# Patient Record
Sex: MALE | Race: WHITE | NOT HISPANIC OR LATINO | URBAN - METROPOLITAN AREA
[De-identification: names, ages, dates, MRNs, and addresses within clinical notes are randomized per-mention and may not be internally consistent; named-entity substitution may affect disease eponyms.]

---

## 2017-06-21 ENCOUNTER — COMMUNICATION - HEALTHEAST (OUTPATIENT)
Dept: INTERNAL MEDICINE | Facility: CLINIC | Age: 62
End: 2017-06-21

## 2017-06-21 ENCOUNTER — OFFICE VISIT - HEALTHEAST (OUTPATIENT)
Dept: INTERNAL MEDICINE | Facility: CLINIC | Age: 62
End: 2017-06-21

## 2017-06-21 DIAGNOSIS — Z12.5 PROSTATE CANCER SCREENING: ICD-10-CM

## 2017-06-21 DIAGNOSIS — Z79.899 ENCOUNTER FOR LONG-TERM (CURRENT) USE OF OTHER MEDICATIONS: ICD-10-CM

## 2017-06-21 DIAGNOSIS — E78.00 PURE HYPERCHOLESTEROLEMIA: ICD-10-CM

## 2017-06-21 DIAGNOSIS — Z00.00 HEALTH CARE MAINTENANCE: ICD-10-CM

## 2017-06-21 DIAGNOSIS — N40.0 BENIGN PROSTATIC HYPERPLASIA, PRESENCE OF LOWER URINARY TRACT SYMPTOMS UNSPECIFIED, UNSPECIFIED MORPHOLOGY: ICD-10-CM

## 2017-06-21 DIAGNOSIS — I10 ESSENTIAL HYPERTENSION, BENIGN: ICD-10-CM

## 2017-06-21 LAB
CHOLEST SERPL-MCNC: 155 MG/DL
FASTING STATUS PATIENT QL REPORTED: YES
HDLC SERPL-MCNC: 46 MG/DL
LDLC SERPL CALC-MCNC: 96 MG/DL
PSA SERPL-MCNC: 0.9 NG/ML (ref 0–4.5)
TRIGL SERPL-MCNC: 65 MG/DL

## 2017-06-21 ASSESSMENT — MIFFLIN-ST. JEOR: SCORE: 1641.95

## 2018-06-25 ENCOUNTER — COMMUNICATION - HEALTHEAST (OUTPATIENT)
Dept: INTERNAL MEDICINE | Facility: CLINIC | Age: 63
End: 2018-06-25

## 2018-06-25 ENCOUNTER — OFFICE VISIT - HEALTHEAST (OUTPATIENT)
Dept: INTERNAL MEDICINE | Facility: CLINIC | Age: 63
End: 2018-06-25

## 2018-06-25 DIAGNOSIS — Z00.00 HEALTH CARE MAINTENANCE: ICD-10-CM

## 2018-06-25 DIAGNOSIS — I10 ESSENTIAL HYPERTENSION, BENIGN: ICD-10-CM

## 2018-06-25 DIAGNOSIS — Z12.5 SCREENING FOR PROSTATE CANCER: ICD-10-CM

## 2018-06-25 DIAGNOSIS — E78.00 PURE HYPERCHOLESTEROLEMIA: ICD-10-CM

## 2018-06-25 DIAGNOSIS — Z51.81 MEDICATION MONITORING ENCOUNTER: ICD-10-CM

## 2018-06-25 LAB
ALBUMIN SERPL-MCNC: 4 G/DL (ref 3.5–5)
ALP SERPL-CCNC: 58 U/L (ref 45–120)
ALT SERPL W P-5'-P-CCNC: 25 U/L (ref 0–45)
ANION GAP SERPL CALCULATED.3IONS-SCNC: 8 MMOL/L (ref 5–18)
AST SERPL W P-5'-P-CCNC: 22 U/L (ref 0–40)
BILIRUB SERPL-MCNC: 0.8 MG/DL (ref 0–1)
BUN SERPL-MCNC: 12 MG/DL (ref 8–22)
CALCIUM SERPL-MCNC: 9 MG/DL (ref 8.5–10.5)
CHLORIDE BLD-SCNC: 105 MMOL/L (ref 98–107)
CHOLEST SERPL-MCNC: 149 MG/DL
CO2 SERPL-SCNC: 28 MMOL/L (ref 22–31)
CREAT SERPL-MCNC: 0.94 MG/DL (ref 0.7–1.3)
ERYTHROCYTE [DISTWIDTH] IN BLOOD BY AUTOMATED COUNT: 11.8 % (ref 11–14.5)
FASTING STATUS PATIENT QL REPORTED: YES
GFR SERPL CREATININE-BSD FRML MDRD: >60 ML/MIN/1.73M2
GLUCOSE BLD-MCNC: 100 MG/DL (ref 70–125)
HCT VFR BLD AUTO: 48.8 % (ref 40–54)
HDLC SERPL-MCNC: 43 MG/DL
HGB BLD-MCNC: 16.4 G/DL (ref 14–18)
LDLC SERPL CALC-MCNC: 85 MG/DL
MCH RBC QN AUTO: 31.6 PG (ref 27–34)
MCHC RBC AUTO-ENTMCNC: 33.7 G/DL (ref 32–36)
MCV RBC AUTO: 94 FL (ref 80–100)
PLATELET # BLD AUTO: 199 THOU/UL (ref 140–440)
PMV BLD AUTO: 7.7 FL (ref 7–10)
POTASSIUM BLD-SCNC: 3.7 MMOL/L (ref 3.5–5)
PROT SERPL-MCNC: 7 G/DL (ref 6–8)
PSA SERPL-MCNC: 1.1 NG/ML (ref 0–4.5)
RBC # BLD AUTO: 5.2 MILL/UL (ref 4.4–6.2)
SODIUM SERPL-SCNC: 141 MMOL/L (ref 136–145)
TRIGL SERPL-MCNC: 103 MG/DL
WBC: 6.5 THOU/UL (ref 4–11)

## 2018-06-25 ASSESSMENT — MIFFLIN-ST. JEOR: SCORE: 1649.89

## 2021-05-29 ENCOUNTER — RECORDS - HEALTHEAST (OUTPATIENT)
Dept: ADMINISTRATIVE | Facility: CLINIC | Age: 66
End: 2021-05-29

## 2021-05-30 ENCOUNTER — RECORDS - HEALTHEAST (OUTPATIENT)
Dept: ADMINISTRATIVE | Facility: CLINIC | Age: 66
End: 2021-05-30

## 2021-05-31 ENCOUNTER — RECORDS - HEALTHEAST (OUTPATIENT)
Dept: ADMINISTRATIVE | Facility: CLINIC | Age: 66
End: 2021-05-31

## 2021-05-31 VITALS — HEIGHT: 69 IN | WEIGHT: 195 LBS | BODY MASS INDEX: 28.88 KG/M2

## 2021-06-01 VITALS — WEIGHT: 195 LBS | BODY MASS INDEX: 28.88 KG/M2 | HEIGHT: 69 IN

## 2021-06-11 NOTE — PROGRESS NOTES
[facility] Follow Up Note    Ernesto Lawson   61 y.o. male    Date of Visit: 6/21/2017    Chief Complaint   Patient presents with     Annual Exam     pt fasting     Subjective  ernesto is here for health and physical exam.    His main issue is cardiovascular risk with hypertension and hypercholesterolemia.  Over 10 years ago he had a cardiac CT scan with a score of 402.  2007 stress test negative.  His cholesterol and hypertension of been well controlled.  On Lipitor and amlodipine and aspirin.    No epigastric pain or GI bleeding.    He is very active and bikes 28 miles yesterday, able to bike quickly uphills.  No chest pain.  Sometimes he feels mildly more short of breath with exertion and he has in the past, it has been slowly progressive over a number of years.  He still cross-country skis in the winter.    He has been retired for a year, that is going well for him.  He feels he has been more active since retiring.  He sleeps well at night and no sleep apnea symptoms.    He has never smoked.  No new cough.    His mother had hypertension and his sister has coronary artery disease.  Her graph patient has had some chronic intermittent tension type headaches for many years.  He had another evaluation last summer with negative brain MRI.  He has a vague blurry vision feeling toward the end of the day at times.  Feels his peripheral vision is less toward the end of the day, he describes an eye fatigue type scenario.  That has been intermittent and present for over a year.  It has been 2 years since he had cataract surgery has not seen the eye doctor since then.  There was some talk of a YAG laser for the film on his lens implant.  He does have an appointment later this summer with ophthalmology in August.    June 2016 CT scan of the abdomen showed multiple 1-2 mm kidney stones but not obstructing and he has had no symptoms.    Patient had physical exam by another physician last year and the  "calcified seminal vesicle was noted he was sent for prostate biopsy last June which was negative.  He developed some prostatitis after that difficult voiding, but is voiding normally now no longer needing Flomax.    Bowel movements are normal.  Colonoscopy February 2011 negative.  No family history of colon cancer.  10 year follow-up.    His diet is good.    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    He is  without children.    No skin changes or history of skin cancer.    He gets occasional food stuck in his esophagus, but that is rare, usually when eating quickly, present for many years without progression.  No heartburn issues.    No pain or arthritis complaints.    PMHx:    Past Medical History:   Diagnosis Date     Hyperlipidemia      Hypertension      PSHx:    Past Surgical History:   Procedure Laterality Date     EYE SURGERY      cataract, both eyes, 2015     Immunizations:   Immunization History   Administered Date(s) Administered     Tdap 05/24/2011       ROS A comprehensive review of systems was performed and was otherwise negative    Medications, allergies, and problem list were reviewed and updated    Exam  /60  Pulse 71  Ht 5' 8.5\" (1.74 m)  Wt 195 lb (88.5 kg)  SpO2 96%  BMI 29.22 kg/m2  Pupils and irises equal and reactive.  Eyes appear normal.  Extraocular muscles normal.  No conjunctivitis or scleral icterus.  Alert and oriented ×3 and normal mood and affect.  External ears and nose exam is normal.  Tympanic membranes are normal.  Pharynx is normal.  No leukoplakia or oral lesions.  Teeth in good condition.  No cervical or supraclavicular or axillary or inguinal adenopathy.  No JVD and no carotid bruits.  No thyromegaly or nodularity.  Lungs clear to auscultation with normal respiratory excursion.  Heart is regular without murmur.  Abdomen is mildly overweight but nontender no hepatosplenomegaly or pulsatile mass.  No ankle edema " and +2 pedal pulses bilaterally.  Feet in good condition.  He does have toenail fungus thickening.  Testicles normal bilaterally and normal external genitalia, uncircumcised.  No inguinal adenopathy.  Prostate exam is smooth and symmetric and no prostate nodule.  He has a calcified seminal vesicle on the right unchanged.  Skin exam without suspicious lesions.  Gait normal.  Muscle skeletal exam normal.    Assessment/Plan  1. Health care maintenance  Main issue is cardiovascular risk, well-controlled with current medical management.    He is having some vision changes and is due for a routine eye exam in any case, he has an appointment in August with ophthalmology.    Ten-year colonoscopy February 2021.    Healthy diet and regular exercise discussed, continue.    2. Hypercholesterolemia  Well-controlled on Lipitor 10 mg.  Goal LDL less than 130.  - atorvastatin (LIPITOR) 10 MG tablet; TAKE ONE TABLET DAILY AFTER EVENING MEAL  Dispense: 90 tablet; Refill: 3  - Lipid [place]    Excellent exercise tolerance.  I discussed if he does have any worsening shortness of breath with exertion or any new chest, shoulder or back symptoms or neck symptoms, seek medical attention immediately to evaluate for further evaluation or possible stress testing.    3. Benign Essential Hypertension  Controlled, continue amlodipine 5 mg a day  - amLODIPine (NORVASC) 5 MG tablet; Take 1 tablet (5 mg total) by mouth daily.  Dispense: 90 tablet; Refill: 3    4. Encounter for long-term (current) use of other medications    - Comprehensive Metabolic Panel  - HM2(CBC w/o Differential)    5. Benign prostatic hyperplasia, presence of lower urinary tract symptoms unspecified, unspecified morphology  No symptoms now, no longer needing Flomax    6. Prostate cancer screening  Calcified seminal vesicle but otherwise negative exam.  - PSA (Prostatic-Specific Antigen), Annual Screen    Nonspecific vision symptoms, see ophthalmology    Chronic tension  headaches, negative neurologic exam last year.  Patient was told if any worsening or new symptoms, to return for further evaluation.    Nephrolithiasis, asymptomatic.  Stay well-hydrated.    Return in about 1 year (around 6/21/2018) for Annual physical.   There are no Patient Instructions on file for this visit.  Nicolás Clements MD      Current Outpatient Prescriptions   Medication Sig Dispense Refill     aspirin 81 MG EC tablet Take 81 mg by mouth daily.       amLODIPine (NORVASC) 5 MG tablet Take 1 tablet (5 mg total) by mouth daily. 90 tablet 3     atorvastatin (LIPITOR) 10 MG tablet TAKE ONE TABLET DAILY AFTER EVENING MEAL 90 tablet 3     No current facility-administered medications for this visit.      No Known Allergies  Social History   Substance Use Topics     Smoking status: Never Smoker     Smokeless tobacco: Never Used     Alcohol use None

## 2021-06-18 NOTE — PROGRESS NOTES
[facility] Follow Up Note    Ernesto Lawson   62 y.o. male    Date of Visit: 6/25/2018    Chief Complaint   Patient presents with     Annual Exam     fasting     Subjective  Alma Delia is here for health maintenance physical exam and follow-up on hypertension hypercholesterolemia.    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  Remains physically active with regular bike riding and walking.  Cross-country skiing in the winter.    Generally good exertional ability.  No increasing shortness of breath with exertion.  No chest pain or chest pressure with exertion.    He occasionally gets short palpitations of last 20 seconds or less at rest, these are infrequent.  No lightheaded dizzy spells or other symptoms with them.  He has not had the diagnosis of arrhythmia.    He feels he sleeps well at night denies significant daytime sleepiness, does not feel he has sleep apnea.  He does snore but no witnessed apneas.  He does not want to do a sleep study.    No significant alcohol.  He has never smoked.    Hypertension is been well-controlled without lightheaded dizzy spells on amlodipine 5 mg a day.  Blood pressures are running in the 120s over 70s-80.  No edema.    He is on Lipitor 10 mg a day.  Cholesterol is been well-controlled.  No epigastric pain or bleeding on the aspirin.    Over 10 years ago his cardiac CT scan with a score of 402.  2007 stress test negative.    He has had no worsening swallowing difficulty.  He occasionally gets some food stuck on a rare occasion especially with spicy food.  Only rare heartburn.  He is reported that in years past.    Tension headaches occasionally.  No worsening frequency.  Usually bilateral occipital.  No headache currently.  2016 MRI was negative.    He has had occasional double vision, last episode was 2 weeks ago and previous to that was 6 months ago.  Last just about 30 seconds.  Not associated with a  "headache.  No flashing lights.  No other focal weakness issues.  No change in gait.    He did see the ophthalmologist 4 weeks ago, vision was normal and exam was normal.  Her graph 2015 cataract surgery was done.    No flank pain or abdominal pain.  2016 abdominal CT scan did show some multiple 1-2 mm kidney stones, no symptoms.    Patient had a calcified seminal vesicle with prostate biopsy in 2016 that was negative.  Some prostatitis symptoms since then but not in the last year.  He is voiding normally over the past year.  Just 1-2 times a night nocturia.    Bowel movements are normal.  No blood in stool or melena.  Colonoscopy February 2011 was negative.  No family history of colon cancer.  10 year follow-up plan.    No vertigo or hearing changes.  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    No history of skin cancer no skin changes.    He is  with no children.    Family history of hypertension with mother and sister had coronary artery disease.    No arthritis or pain complaints or back pain.    Diet is been excellent.    PMHx:    Past Medical History:   Diagnosis Date     Hyperlipidemia      Hypertension      PSHx:    Past Surgical History:   Procedure Laterality Date     EYE SURGERY      cataract, both eyes, 2015     Immunizations:   Immunization History   Administered Date(s) Administered     Tdap 05/24/2011       ROS A comprehensive review of systems was performed and was otherwise negative    Medications, allergies, and problem list were reviewed and updated    Exam  /82  Pulse 74  Ht 5' 9\" (1.753 m)  Wt 195 lb (88.5 kg)  SpO2 96%  BMI 28.8 kg/m2  Healthy-appearing male.  Alert and oriented ×3.  Normal mood and affect.  Pupils and irises are equal and reactive.  Extraocular muscles intact.  Conjugate vision intact.  No conjunctivitis or scleral icterus.  Periorbital tissues appear normal.  External ears and nose exam is normal and tympanic membranes are normal.  " Pharynx is normal.  Teeth in good condition.  No leukoplakia or normal oral mucosa.  No cervical or supraclavicular or axillary or inguinal adenopathy.  No JVD and no carotid bruits.  No thyromegaly or nodularity.  Lungs are clear to auscultation with normal respiratory excursion.  Spine is straight.  Heart is regular with no murmur rub or gallop.  Abdomen is nontender nonobese no hepatosplenomegaly and no pulsatile mass.  No ankle edema.  +2 pedal pulses bilaterally and feet in good condition.  Toenail fungus unchanged.  Normal testicles, no inguinal hernia.  Prostate is smooth and symmetric without nodule.  He has a calcified seminal vesicle on the left unchanged from previous.  That was a typo on last years physical.  The calcified seminal vesicles on the left, not the right.  Gait is normal.  Skin exam normal to inspection and palpation.  Skeletal exam normal.    Assessment/Plan  1. Health care maintenance  Overall patient is doing well.  Remains active.  He does have some moderate cardiovascular risk factors.    10 year colonoscopy will be due February 2021.    I discussed the new shingles vaccine, encouraged him to get that at the local pharmacy.    He is planning to travel to [place].  I did direct him to the travel clinic if he has questions about preparation for that trip.    Continue excellent diet and exercise routine.    Follow-up in 1 year for physical and stable.    If he does have any worsening double vision or headaches he can return for further evaluation on that.  He has had headaches evaluated in the past and negative imaging 2 years ago.    2. Hypercholesterolemia  Goal LDL less than 130  - atorvastatin (LIPITOR) 10 MG tablet; TAKE ONE TABLET DAILY AFTER EVENING MEAL  Dispense: 90 tablet; Refill: 3  - Lipid [place]    3. Benign Essential Hypertension  Controlled  - amLODIPine (NORVASC) 5 MG tablet; Take 1 tablet (5 mg total) by mouth daily.  Dispense: 90 tablet; Refill: 3    Follow-up of any  worsening palpitations.    4. Screening for prostate cancer  Calcified left seminal vesicle unchanged  - PSA (Prostatic-Specific Antigen), Annual Screen    5. Medication monitoring encounter    - Comprehensive Metabolic Panel  - HM2(CBC w/o Differential)    No symptoms of kidney stones.  Stay well-hydrated.    Return in about 1 year (around 6/25/2019) for Annual physical.   Patient Instructions   Routine lab work today.    You could consider the new shingles vaccine, Shingrix, at your local pharmacy.      Follow-up as needed if any worsening symptoms or issues that you feel need to be reevaluated.    Otherwise, follow-up in 1 year for a Welcome to Medicare physical.    Your 10 year colonoscopy will be planned for February 2021.    Nicolás Clements MD        Current Outpatient Prescriptions   Medication Sig Dispense Refill     amLODIPine (NORVASC) 5 MG tablet Take 1 tablet (5 mg total) by mouth daily. 90 tablet 3     aspirin 81 MG EC tablet Take 81 mg by mouth daily.       atorvastatin (LIPITOR) 10 MG tablet TAKE ONE TABLET DAILY AFTER EVENING MEAL 90 tablet 3     No current facility-administered medications for this visit.      No Known Allergies  Social History   Substance Use Topics     Smoking status: Never Smoker     Smokeless tobacco: Never Used     Alcohol use None

## 2021-09-05 ENCOUNTER — HEALTH MAINTENANCE LETTER (OUTPATIENT)
Age: 66
End: 2021-09-05

## 2022-10-23 ENCOUNTER — HEALTH MAINTENANCE LETTER (OUTPATIENT)
Age: 67
End: 2022-10-23

## 2023-11-11 ENCOUNTER — HEALTH MAINTENANCE LETTER (OUTPATIENT)
Age: 68
End: 2023-11-11